# Patient Record
(demographics unavailable — no encounter records)

---

## 2017-05-03 NOTE — [facility]
PROCEDURE 

Thyroid ultrasound. 

 

HISTORY 

Nodule. 

 

TECHNIQUE 

Thyroid ultrasound was performed. 

 

COMPARISON 

None at this institution. 

 

FINDINGS 

Right thyroid lobe measures 4.3 x 1.2 x 2.0 centimeters and the left 4.2 x

0.9 x 1.6 centimeters. Both lobes are heterogeneous but are without 

discrete lesion. Color flow to the thyroid does not appear increased or 

decreased. 

Mildly prominent lymph nodes are noted, largest is left submandibular 

measuring 2.0 x 2.0 x 1.0 centimeters. 

 

IMPRESSION 

1. Heterogeneous thyroid without discrete nodule.

2. Mildly prominent lymph nodes along the cervical chains, largest is left

submandibular. At the minimum, clinical follow up to resolution is 

suggested. Short-term follow-up ultrasound can be considered if warranted 

clinically.

 

 

 

Electronically signed by: Cameron Valencia MD (May 03, 2017 10:16:50)